# Patient Record
Sex: FEMALE | Race: WHITE
[De-identification: names, ages, dates, MRNs, and addresses within clinical notes are randomized per-mention and may not be internally consistent; named-entity substitution may affect disease eponyms.]

---

## 2019-04-22 ENCOUNTER — HOSPITAL ENCOUNTER (EMERGENCY)
Dept: HOSPITAL 25 - UCEAST | Age: 59
Discharge: HOME | End: 2019-04-22
Payer: COMMERCIAL

## 2019-04-22 VITALS — DIASTOLIC BLOOD PRESSURE: 75 MMHG | SYSTOLIC BLOOD PRESSURE: 113 MMHG
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
## 2019-04-22 DIAGNOSIS — Z88.0: ICD-10-CM

## 2019-04-22 DIAGNOSIS — L27.1: Primary | ICD-10-CM

## 2019-04-22 DIAGNOSIS — E03.9: ICD-10-CM

## 2019-04-22 DIAGNOSIS — T36.5X5A: ICD-10-CM

## 2019-04-22 PROCEDURE — G0463 HOSPITAL OUTPT CLINIC VISIT: HCPCS

## 2019-04-22 PROCEDURE — 99212 OFFICE O/P EST SF 10 MIN: CPT

## 2019-04-22 PROCEDURE — 96372 THER/PROPH/DIAG INJ SC/IM: CPT

## 2019-04-22 NOTE — UC
Skin Complaint HPI





- HPI Summary


HPI Summary: 





59 yo female presents with rash. She tells me that she recently started 

neosporin for an intestinal issue. About 5 days ago developed a rash on her 

back and abdomen. She stopped taking the neosporin, but the rash has continued. 

She has been taking benadryl po with no change in rash. Rash is itchy. No 

swelling or difficulty breathing. No fever or chills. 





- History of Current Complaint


Chief Complaint: UCSkin


Time Seen by Provider: 04/22/19 12:04


Stated Complaint: HIVES


Hx Obtained From: Patient


Onset/Duration: Sudden Onset


Current Severity: None


Pain Intensity: 0





- Allergy/Home Medications


Allergies/Adverse Reactions: 


 Allergies











Allergy/AdvReac Type Severity Reaction Status Date / Time


 


amoxicillin Allergy  Rash Verified 10/16/18 15:44


 


neomycin Allergy  Hives Verified 04/22/19 12:01














PMH/Surg Hx/FS Hx/Imm Hx


Endocrine History: Hypothyroidism





- Surgical History


Surgical History: Yes


Surgery Procedure, Year, and Place: T & A





- Family History


Known Family History: Positive: Other - Thyroid problems





- Social History


Occupation: Employed Full-time


Lives: With Family


Alcohol Use: Occasionally


Substance Use Type: None


Smoking Status (MU): Never Smoked Tobacco





Review of Systems


All Other Systems Reviewed And Are Negative: Yes


Constitutional: Positive: Negative


Skin: Positive: Rash


Eyes: Positive: Negative


ENT: Positive: Negative


Respiratory: Positive: Negative


Cardiovascular: Positive: Negative


Gastrointestinal: Positive: Negative


Neurovascular: Positive: Negative


Neurological: Positive: Negative


Psychological: Positive: Negative





Physical Exam





- Summary


Physical Exam Summary: 





 


GENERAL: NAD. WDWN. No pain distress.


SKIN: Abdomen, back, and chest with diffuse blanchable urticaria and 

excoriations. 


NECK: Supple. Nontender. No lymphadenopathy. 


HEENT:


            Head: AT/NC


            Throat: Posterior oropharynx without exudates, erythema, or 

tonsillar enlargement.  Uvula midline. Airway patent. 


CHEST: CTAB. No accessory muscle use. Breathing comfortably and in no distress.


CV:  Pulses intact. Cap refill <2seconds


NEURO: Alert.


PSYCH: Age appropriate behavior.





Triage Information Reviewed: Yes


Vital Signs: 


 Initial Vital Signs











Temp  98.3 F   04/22/19 11:56


 


Pulse  75   04/22/19 11:56


 


Resp  12   04/22/19 11:56


 


BP  113/75   04/22/19 11:56


 


Pulse Ox  100   04/22/19 11:56











Vital Signs Reviewed: Yes





Course/Dx





- Course


Course Of Treatment: 





Pt was given kenalog 40mg IM in the clinic and will rx for prednisone. Suspect 

allergic reaction to the neomycin and advised not to resume this and f/u with 

her PCP for alternative treatments.





- Diagnoses


Provider Diagnosis: 


 Allergic reaction caused by a drug








Discharge





- Sign-Out/Discharge


Documenting (check all that apply): Patient Departure


All imaging exams completed and their final reports reviewed: No Studies





- Discharge Plan


Condition: Stable


Disposition: HOME


Prescriptions: 


predniSONE TAB* [Deltasone 20 MG TAB*] 20 mg PO DAILY #9 tab


Patient Education Materials:  Urticaria (ED), General Allergic Reaction (ED)


Referrals: 


Radha Coello MD [Primary Care Provider] - 


Additional Instructions: 


If you develop a fever, shortness of breath, chest pain, new or worsening 

symptoms - please call your PCP or go to the ED.


 


1) Continue taking the benadryl and using the itch cream





2) Start taking the PREDNISONE tomorrow as you were given a dose of steroid in 

the clinic today





- Billing Disposition and Condition


Condition: STABLE


Disposition: Home